# Patient Record
Sex: FEMALE | Race: WHITE
[De-identification: names, ages, dates, MRNs, and addresses within clinical notes are randomized per-mention and may not be internally consistent; named-entity substitution may affect disease eponyms.]

---

## 2018-02-07 ENCOUNTER — TRANSCRIPTION ENCOUNTER (OUTPATIENT)
Age: 28
End: 2018-02-07

## 2018-11-01 ENCOUNTER — TRANSCRIPTION ENCOUNTER (OUTPATIENT)
Age: 28
End: 2018-11-01

## 2019-01-27 ENCOUNTER — TRANSCRIPTION ENCOUNTER (OUTPATIENT)
Age: 29
End: 2019-01-27

## 2019-02-14 ENCOUNTER — TRANSCRIPTION ENCOUNTER (OUTPATIENT)
Age: 29
End: 2019-02-14

## 2019-05-01 ENCOUNTER — TRANSCRIPTION ENCOUNTER (OUTPATIENT)
Age: 29
End: 2019-05-01

## 2019-08-16 ENCOUNTER — TRANSCRIPTION ENCOUNTER (OUTPATIENT)
Age: 29
End: 2019-08-16

## 2020-02-13 PROBLEM — Z00.00 ENCOUNTER FOR PREVENTIVE HEALTH EXAMINATION: Status: ACTIVE | Noted: 2020-02-13

## 2020-02-21 ENCOUNTER — APPOINTMENT (OUTPATIENT)
Dept: CARDIOLOGY | Facility: CLINIC | Age: 30
End: 2020-02-21
Payer: COMMERCIAL

## 2020-02-21 ENCOUNTER — NON-APPOINTMENT (OUTPATIENT)
Age: 30
End: 2020-02-21

## 2020-02-21 VITALS
WEIGHT: 136 LBS | DIASTOLIC BLOOD PRESSURE: 68 MMHG | HEIGHT: 63 IN | HEART RATE: 71 BPM | OXYGEN SATURATION: 96 % | BODY MASS INDEX: 24.1 KG/M2 | SYSTOLIC BLOOD PRESSURE: 92 MMHG

## 2020-02-21 VITALS — DIASTOLIC BLOOD PRESSURE: 68 MMHG | SYSTOLIC BLOOD PRESSURE: 94 MMHG

## 2020-02-21 DIAGNOSIS — Z82.49 FAMILY HISTORY OF ISCHEMIC HEART DISEASE AND OTHER DISEASES OF THE CIRCULATORY SYSTEM: ICD-10-CM

## 2020-02-21 DIAGNOSIS — Z83.3 FAMILY HISTORY OF DIABETES MELLITUS: ICD-10-CM

## 2020-02-21 DIAGNOSIS — Z78.9 OTHER SPECIFIED HEALTH STATUS: ICD-10-CM

## 2020-02-21 DIAGNOSIS — Z13.6 ENCOUNTER FOR SCREENING FOR CARDIOVASCULAR DISORDERS: ICD-10-CM

## 2020-02-21 DIAGNOSIS — Z82.79 FAMILY HISTORY OF OTHER CONGENITAL MALFORMATIONS, DEFORMATIONS AND CHROMOSOMAL ABNORMALITIES: ICD-10-CM

## 2020-02-21 DIAGNOSIS — Q89.8 OTHER SPECIFIED CONGENITAL MALFORMATIONS: ICD-10-CM

## 2020-02-21 DIAGNOSIS — Q23.1 CONGENITAL INSUFFICIENCY OF AORTIC VALVE: ICD-10-CM

## 2020-02-21 DIAGNOSIS — Z83.438 FAMILY HISTORY OF OTHER DISORDER OF LIPOPROTEIN METABOLISM AND OTHER LIPIDEMIA: ICD-10-CM

## 2020-02-21 PROCEDURE — 0296T: CPT

## 2020-02-21 PROCEDURE — 99244 OFF/OP CNSLTJ NEW/EST MOD 40: CPT

## 2020-02-21 PROCEDURE — 93000 ELECTROCARDIOGRAM COMPLETE: CPT

## 2020-02-21 NOTE — PHYSICAL EXAM
[Normal Appearance] : normal appearance [General Appearance - Well Developed] : well developed [Well Groomed] : well groomed [General Appearance - Well Nourished] : well nourished [No Deformities] : no deformities [General Appearance - In No Acute Distress] : no acute distress [Normal Conjunctiva] : the conjunctiva exhibited no abnormalities [Eyelids - No Xanthelasma] : the eyelids demonstrated no xanthelasmas [Normal Oral Mucosa] : normal oral mucosa [No Oral Pallor] : no oral pallor [No Oral Cyanosis] : no oral cyanosis [Normal Jugular Venous A Waves Present] : normal jugular venous A waves present [Normal Jugular Venous V Waves Present] : normal jugular venous V waves present [No Jugular Venous Moreno A Waves] : no jugular venous moreno A waves [No Precordial Heave] : no precordial heave was noted [Normal] : normal [Normal Rate] : normal [Rhythm Regular] : regular [Normal S1] : normal S1 [Normal S2] : normal S2 [No Murmur] : no murmurs heard [2+] : left 2+ [No Abnormalities] : the abdominal aorta was not enlarged and no bruit was heard [No Pitting Edema] : no pitting edema present [Respiration, Rhythm And Depth] : normal respiratory rhythm and effort [Exaggerated Use Of Accessory Muscles For Inspiration] : no accessory muscle use [Auscultation Breath Sounds / Voice Sounds] : lungs were clear to auscultation bilaterally [Abdomen Tenderness] : non-tender [Abdomen Soft] : soft [Abdomen Mass (___ Cm)] : no abdominal mass palpated [Abnormal Walk] : normal gait [Gait - Sufficient For Exercise Testing] : the gait was sufficient for exercise testing [Nail Clubbing] : no clubbing of the fingernails [Petechial Hemorrhages (___cm)] : no petechial hemorrhages [Cyanosis, Localized] : no localized cyanosis [Skin Color & Pigmentation] : normal skin color and pigmentation [No Venous Stasis] : no venous stasis [] : no rash [Skin Lesions] : no skin lesions [No Skin Ulcers] : no skin ulcer [No Xanthoma] : no  xanthoma was observed [Oriented To Time, Place, And Person] : oriented to person, place, and time [Affect] : the affect was normal [Mood] : the mood was normal [No Anxiety] : not feeling anxious [S4] : no S4 [Right Carotid Bruit] : no bruit heard over the right carotid [S3] : no S3 [Left Femoral Bruit] : no bruit heard over the left femoral artery [Right Femoral Bruit] : no bruit heard over the right femoral artery [Left Carotid Bruit] : no bruit heard over the left carotid

## 2020-02-21 NOTE — DISCUSSION/SUMMARY
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 Active lifestyle and regular. Exercise along with weight management is advised.\par All the above were at length reviewed. Answered all the questions. Thank you very much for this kind referral. Please do not hesitate to give me a call for any question.\par Part of this transcription was done with voice recognition software and phonetically similar errors are common. I apologize for that. Please do not hesitate to call for any questions due to above.\par

## 2020-02-21 NOTE — REVIEW OF SYSTEMS
[see HPI] : see HPI [Palpitations] : palpitations [Negative] : Heme/Lymph [Shortness Of Breath] : no shortness of breath [Chest Pain] : no chest pain [Chest  Pressure] : no chest pressure [Dyspnea on exertion] : not dyspnea during exertion [Lower Ext Edema] : no extremity edema [Leg Claudication] : no intermittent leg claudication

## 2020-02-21 NOTE — REASON FOR VISIT
[Consultation] : a consultation regarding [Palpitations] : palpitations [Parent] : parent [FreeTextEntry1] : 29-year-old female comes in for consultation referred for intermittent palpitation mainly with certain type of activity where there is a jumping and sudden deceleration involved.  She felt the symptoms since early childhood.  She was evaluated by pediatric cardiologist when she was 5 or 6 years old.  No significant findings were noted at the time.  Now she is finding it slightly irregular and lasted for longer period of time a week or 2 ago.  There is no associated dizziness lightheadedness chest pain shortness of breath.  There is no history of near syncopal or syncopal event\par There is no family history of sudden cardiac death\par There is family history of cardiovascular illness which includes family history of bicuspid aortic valve in her siblings and family history of cardiac arrhythmias requiring devices, family history of congestive heart failure\par She has no history of hypertension diabetes mellitus myocardial infarction coronary artery disease cerebrovascular accident peripheral arterial disease\par She has no history of Lyme disease or thyroid disorder\par Her weight diet appetite is stable\par She remains very active place singles badminton, hikes and jogs on a regular basis.\par She has no increased caffeine or alcohol intake.  She has about 1-2 drinks of caffeinated drinks.\par She has no other medication besides the one listed.\par No over-the-counter medications.

## 2020-03-10 ENCOUNTER — TRANSCRIPTION ENCOUNTER (OUTPATIENT)
Age: 30
End: 2020-03-10

## 2020-03-13 PROCEDURE — 0298T: CPT

## 2020-03-19 ENCOUNTER — APPOINTMENT (OUTPATIENT)
Dept: CARDIOLOGY | Facility: CLINIC | Age: 30
End: 2020-03-19

## 2020-03-23 ENCOUNTER — APPOINTMENT (OUTPATIENT)
Dept: CARDIOLOGY | Facility: CLINIC | Age: 30
End: 2020-03-23

## 2020-05-04 ENCOUNTER — APPOINTMENT (OUTPATIENT)
Dept: CARDIOLOGY | Facility: CLINIC | Age: 30
End: 2020-05-04

## 2020-05-21 ENCOUNTER — APPOINTMENT (OUTPATIENT)
Dept: CARDIOLOGY | Facility: CLINIC | Age: 30
End: 2020-05-21

## 2020-08-24 ENCOUNTER — APPOINTMENT (OUTPATIENT)
Dept: CARDIOLOGY | Facility: CLINIC | Age: 30
End: 2020-08-24
Payer: COMMERCIAL

## 2020-08-24 PROCEDURE — 93306 TTE W/DOPPLER COMPLETE: CPT

## 2020-08-24 PROCEDURE — 93015 CV STRESS TEST SUPVJ I&R: CPT

## 2020-09-28 ENCOUNTER — APPOINTMENT (OUTPATIENT)
Dept: CARDIOLOGY | Facility: CLINIC | Age: 30
End: 2020-09-28
Payer: COMMERCIAL

## 2020-09-28 VITALS
DIASTOLIC BLOOD PRESSURE: 62 MMHG | HEIGHT: 63 IN | OXYGEN SATURATION: 98 % | WEIGHT: 141 LBS | HEART RATE: 74 BPM | SYSTOLIC BLOOD PRESSURE: 88 MMHG | BODY MASS INDEX: 24.98 KG/M2

## 2020-09-28 PROCEDURE — 99215 OFFICE O/P EST HI 40 MIN: CPT

## 2020-09-28 NOTE — PHYSICAL EXAM
[General Appearance - Well Developed] : well developed [Normal Appearance] : normal appearance [Well Groomed] : well groomed [General Appearance - Well Nourished] : well nourished [No Deformities] : no deformities [General Appearance - In No Acute Distress] : no acute distress [Normal Conjunctiva] : the conjunctiva exhibited no abnormalities [Eyelids - No Xanthelasma] : the eyelids demonstrated no xanthelasmas [Normal Jugular Venous A Waves Present] : normal jugular venous A waves present [Normal Jugular Venous V Waves Present] : normal jugular venous V waves present [No Jugular Venous Moreno A Waves] : no jugular venous moreno A waves [Respiration, Rhythm And Depth] : normal respiratory rhythm and effort [Exaggerated Use Of Accessory Muscles For Inspiration] : no accessory muscle use [Auscultation Breath Sounds / Voice Sounds] : lungs were clear to auscultation bilaterally [Abnormal Walk] : normal gait [Gait - Sufficient For Exercise Testing] : the gait was sufficient for exercise testing [Nail Clubbing] : no clubbing of the fingernails [Cyanosis, Localized] : no localized cyanosis [Petechial Hemorrhages (___cm)] : no petechial hemorrhages [Skin Color & Pigmentation] : normal skin color and pigmentation [] : no rash [No Venous Stasis] : no venous stasis [Skin Lesions] : no skin lesions [No Skin Ulcers] : no skin ulcer [No Xanthoma] : no  xanthoma was observed [Oriented To Time, Place, And Person] : oriented to person, place, and time [Affect] : the affect was normal [Mood] : the mood was normal [No Anxiety] : not feeling anxious [Normal] : normal [No Precordial Heave] : no precordial heave was noted [Normal Rate] : normal [Rhythm Regular] : regular [Normal S1] : normal S1 [Normal S2] : normal S2 [S3] : no S3 [S4] : no S4 [No Murmur] : no murmurs heard [Right Carotid Bruit] : no bruit heard over the right carotid [Left Carotid Bruit] : no bruit heard over the left carotid [Right Femoral Bruit] : no bruit heard over the right femoral artery [Left Femoral Bruit] : no bruit heard over the left femoral artery [2+] : left 2+ [No Abnormalities] : the abdominal aorta was not enlarged and no bruit was heard [No Pitting Edema] : no pitting edema present

## 2020-09-28 NOTE — REVIEW OF SYSTEMS
[Shortness Of Breath] : no shortness of breath [Dyspnea on exertion] : not dyspnea during exertion [Chest  Pressure] : no chest pressure [Chest Pain] : no chest pain [Lower Ext Edema] : no extremity edema [Leg Claudication] : no intermittent leg claudication [Palpitations] : palpitations [see HPI] : see HPI [Dizziness] : dizziness [Tremor] : no tremor was seen [Numbness (Hypesthesia)] : no numbness [Convulsions] : no convulsions [Tingling (Paresthesia)] : no tingling [Negative] : Heme/Lymph

## 2020-09-28 NOTE — ASSESSMENT
[FreeTextEntry1] : ekg: nsr normal qt intervals\par \par Reviewed September 28, 2020\par Echocardiogram and exercise treadmill stress test as noted above event monitoring.\par \par Event monitoring.  2 minutes 29 seconds of PSVT.  Maximum heart rate 240.\par \par Labs from May 1, 2020 TSH 0.9 LDL 89 HDL 52 total cholesterol 152 sodium 137 potassium 4.2 creatinine 0.59 LFT normal CBC stable

## 2020-09-28 NOTE — DISCUSSION/SUMMARY
[FreeTextEntry1] : 29-year-old female with above medical history active medical problems as noted below\par 1.  PSVT.  Reviewed with her at length.  Pathophysiology discussed.  Metoprolol after explaining risk benefits alternatives started.  If there is side effects she will contact us.  Electrophysiology consultation recommended.  Valsalva maneuver reviewed.  Persistent symptoms to contact us.  Referred her for a study management of PSVT.\par Any significant persistent symptoms she will call 911.  High risk symptoms reviewed.  Lower caffeine alcohol intake regular activity exercise program discussed.\par 2.  Anxiety and stress management\par Recommended to follow-up with primary care physician on a regular basis\par \par Counseling regarding low saturated fat, salt and carbohydrate intake was reviewed. Active lifestyle and regular. Exercise along with weight management is advised.\par All the above were at length reviewed. Answered all the questions. Thank you very much for this kind referral. Please do not hesitate to give me a call for any question.\par Part of this transcription was done with voice recognition software and phonetically similar errors are common. I apologize for that. Please do not hesitate to call for any questions due to above.\par Follow-up otherwise in 3 months.  EP consultation scheduled.

## 2020-09-28 NOTE — REASON FOR VISIT
[Follow-Up - Clinic] : a clinic follow-up of [Palpitations] : palpitations [FreeTextEntry1] : 29-year-old female comes in for consultation referred for intermittent palpitation mainly with certain type of activity where there is a jumping and sudden deceleration involved.  She felt the symptoms since early childhood.  She was evaluated by pediatric cardiologist when she was 5 or 6 years old.  No significant findings were noted at the time.  Now she is finding it slightly irregular and lasted for longer period of time a week or 2 ago.  There is no associated dizziness lightheadedness chest pain shortness of breath.  There is no history of near syncopal or syncopal event\par There is no family history of sudden cardiac death\par There is family history of cardiovascular illness which includes family history of bicuspid aortic valve in her siblings and family history of cardiac arrhythmias requiring devices, family history of congestive heart failure\par She has no history of hypertension diabetes mellitus myocardial infarction coronary artery disease cerebrovascular accident peripheral arterial disease\par She has no history of Lyme disease or thyroid disorder\par Her weight diet appetite is stable\par She remains very active place singles badminton, hikes and jogs on a regular basis.\par She has no increased caffeine or alcohol intake.  She has about 1-2 drinks of caffeinated drinks.\par She has no other medication besides the one listed.\par No over-the-counter medications.\par \par Since her bowels he had 1 or 2 more episodes.  Lasting for few minutes.  1 of them associated with mild dizziness.  Significant stress.\par No near syncopal or syncopal episode no chest pain. [Parent] : parent

## 2020-10-28 ENCOUNTER — APPOINTMENT (OUTPATIENT)
Dept: ELECTROPHYSIOLOGY | Facility: CLINIC | Age: 30
End: 2020-10-28
Payer: COMMERCIAL

## 2020-10-28 ENCOUNTER — NON-APPOINTMENT (OUTPATIENT)
Age: 30
End: 2020-10-28

## 2020-10-28 VITALS
SYSTOLIC BLOOD PRESSURE: 86 MMHG | HEART RATE: 69 BPM | OXYGEN SATURATION: 99 % | HEIGHT: 63 IN | BODY MASS INDEX: 24.1 KG/M2 | WEIGHT: 136 LBS | TEMPERATURE: 98 F | DIASTOLIC BLOOD PRESSURE: 58 MMHG

## 2020-10-28 DIAGNOSIS — R00.2 PALPITATIONS: ICD-10-CM

## 2020-10-28 DIAGNOSIS — F32.9 ANXIETY DISORDER, UNSPECIFIED: ICD-10-CM

## 2020-10-28 DIAGNOSIS — I47.1 SUPRAVENTRICULAR TACHYCARDIA: ICD-10-CM

## 2020-10-28 DIAGNOSIS — F41.9 ANXIETY DISORDER, UNSPECIFIED: ICD-10-CM

## 2020-10-28 PROCEDURE — 99072 ADDL SUPL MATRL&STAF TM PHE: CPT

## 2020-10-28 PROCEDURE — 99244 OFF/OP CNSLTJ NEW/EST MOD 40: CPT

## 2020-10-28 PROCEDURE — 93000 ELECTROCARDIOGRAM COMPLETE: CPT

## 2020-11-01 PROBLEM — R00.2 PALPITATIONS: Status: ACTIVE | Noted: 2020-02-21

## 2020-11-01 PROBLEM — F41.9 ANXIETY AND DEPRESSION: Status: ACTIVE | Noted: 2020-02-21

## 2020-11-01 PROBLEM — I47.1 PSVT (PAROXYSMAL SUPRAVENTRICULAR TACHYCARDIA): Status: ACTIVE | Noted: 2020-09-28

## 2020-11-01 NOTE — HISTORY OF PRESENT ILLNESS
[FreeTextEntry1] : Patient is a 29-year woman who is seen in consultation regarding her palpitations.  She has had these palpitations since age 5.  There appears where it has subsided but recently she has been experiencing episodes.  The episodes typically occur with some degree of exertion/exercise but also she has had episodes while at rest.  They last few seconds to a minute.  She experienced episodes while playing badminton.  Patient is also a runner and has had occasional episodes with running.  She gets these episodes a few times per month sometimes less.\par \par The patient has a prior history of anxiety/depression.  There is also questionable prior history of PFO question closure.  She was seen physician at [facility] they do not have her records at this time.\par \par The patient was started on metoprolol and unclear what it is improving her symptoms.\par \par An  echocardiogram from 8/24/2020 showed EF 65%, normal left atrial size, minimal mitral vegetation, normal diastolic function, no pericardial effusion, aneurysmal intra-atrial septum with normal pulmonary pressures.\par \par Previous electrocardiogram showed sinus rhythm with right axis.  QT interval was normal and there was no evidence of preexcitation.  The NM interval was also normal.\par \par A Zio patch monitor performed for 13 days between 2/21/2020 and 3/6/2020 showed sinus rhythm with heart rates between 54 and 240 bpm.  Episodes of 240 were very transient.  The P waves were in the T wave and is the tachycardia slow down the P wave emerged.  Occasionally appear to the tachycardia ended abruptly.\par \par

## 2020-11-01 NOTE — PHYSICAL EXAM
[General Appearance - Well Developed] : well developed [General Appearance - In No Acute Distress] : no acute distress [Normal Conjunctiva] : the conjunctiva exhibited no abnormalities [Normal Jugular Venous V Waves Present] : normal jugular venous V waves present [Heart Rate And Rhythm] : heart rate and rhythm were normal [Heart Sounds] : normal S1 and S2 diarrhea [Murmurs] : no murmurs present [Arterial Pulses Normal] : the arterial pulses were normal [Edema] : no peripheral edema present [Auscultation Breath Sounds / Voice Sounds] : lungs were clear to auscultation bilaterally [Abdomen Soft] : soft [Abdomen Tenderness] : non-tender [Abnormal Walk] : normal gait [Nail Clubbing] : no clubbing of the fingernails [Cyanosis, Localized] : no localized cyanosis [No Venous Stasis] : no venous stasis [Impaired Insight] : insight and judgment were intact

## 2020-11-01 NOTE — REVIEW OF SYSTEMS
[Fever] : no fever [Headache] : no headache [Feeling Fatigued] : not feeling fatigued [Blurry Vision] : no blurred vision [Sore Throat] : no sore throat [Palpitations] : palpitations [Cough] : no cough [Abdominal Pain] : no abdominal pain [Depression] : depression [Anxiety] : anxiety [Under Stress] : under stress [Easy Bleeding] : no tendency for easy bleeding [Easy Bruising] : no tendency for easy bruising

## 2020-11-01 NOTE — DISCUSSION/SUMMARY
[FreeTextEntry1] : She has had symptomatic palpitations which some episodes appear to be sinus tachycardia and others might be atrial tach.  It appears that occasionally the tachycardia terminated abruptly.  There are times when the tachycardia actually slowed down but then terminated.  She has had stress and anxiety and is seeking further treatment of the stress and anxiety.\par \par Because of the possibility of both sinus tachycardia as well as possibly atrial tach I recommend that she increase her fluid intake and consider a low-dose beta-blocker.  I recommended a more long-term monitor to further assess her arrhythmia.  Based on the finding of this monitor and treatment of her stress and anxiety will decide whether to proceed with electrophysiologic testing.  I issued her a prescription for propanolol 10 mg p.o. twice daily.  During the monitor.  She may discontinue the medication to allow for recording of her arrhythmia.\par \par We discussed her other work-up that is needed including thyroid function testing and further evaluation by her psychologist/psychiatrist.  She will see me in follow-up after the follow-up extended monitoring.\par \par The patient verbalized understanding but is concerned about taking the beta-blocker.  She may consider taking it as needed for now.\par \par

## 2021-04-08 ENCOUNTER — TRANSCRIPTION ENCOUNTER (OUTPATIENT)
Age: 31
End: 2021-04-08

## 2021-04-14 ENCOUNTER — APPOINTMENT (OUTPATIENT)
Dept: CARDIOLOGY | Facility: CLINIC | Age: 31
End: 2021-04-14

## 2022-02-19 ENCOUNTER — TRANSCRIPTION ENCOUNTER (OUTPATIENT)
Age: 32
End: 2022-02-19

## 2022-08-18 ENCOUNTER — NON-APPOINTMENT (OUTPATIENT)
Age: 32
End: 2022-08-18

## 2023-05-02 ENCOUNTER — NON-APPOINTMENT (OUTPATIENT)
Age: 33
End: 2023-05-02

## 2023-05-29 ENCOUNTER — NON-APPOINTMENT (OUTPATIENT)
Age: 33
End: 2023-05-29

## 2023-06-22 ENCOUNTER — APPOINTMENT (OUTPATIENT)
Dept: OTOLARYNGOLOGY | Facility: CLINIC | Age: 33
End: 2023-06-22
Payer: COMMERCIAL

## 2023-06-22 VITALS — HEIGHT: 64 IN | WEIGHT: 140 LBS | BODY MASS INDEX: 23.9 KG/M2

## 2023-06-22 DIAGNOSIS — Z87.09 PERSONAL HISTORY OF OTHER DISEASES OF THE RESPIRATORY SYSTEM: ICD-10-CM

## 2023-06-22 PROCEDURE — 99203 OFFICE O/P NEW LOW 30 MIN: CPT

## 2023-06-22 RX ORDER — METOPROLOL SUCCINATE 25 MG/1
25 TABLET, EXTENDED RELEASE ORAL DAILY
Qty: 90 | Refills: 0 | Status: DISCONTINUED | COMMUNITY
Start: 2020-09-28 | End: 2023-06-22

## 2023-06-22 RX ORDER — ESCITALOPRAM OXALATE 5 MG/1
5 TABLET, FILM COATED ORAL
Refills: 0 | Status: ACTIVE | COMMUNITY

## 2023-06-22 RX ORDER — VILAZODONE HYDROCHLORIDE 40 MG/1
40 TABLET ORAL DAILY
Refills: 0 | Status: DISCONTINUED | COMMUNITY
Start: 2020-02-09 | End: 2023-06-22

## 2023-06-22 RX ORDER — ALPRAZOLAM 0.25 MG/1
0.25 TABLET ORAL
Qty: 30 | Refills: 0 | Status: DISCONTINUED | COMMUNITY
Start: 2020-02-21 | End: 2023-06-22

## 2023-06-24 ENCOUNTER — TRANSCRIPTION ENCOUNTER (OUTPATIENT)
Age: 33
End: 2023-06-24

## 2023-06-26 PROBLEM — Z87.09 HISTORY OF THROAT PROBLEM: Status: ACTIVE | Noted: 2023-06-26

## 2023-06-26 NOTE — ASSESSMENT
[FreeTextEntry1] : 1–tonsil remnant tissue.  Reassured her that no further work-up or treatment is required.\par \par 2–perceived changes in sense of smell.\par [facility] smell test was normal.  Not going to recommend further work-up at this time.

## 2023-06-26 NOTE — HISTORY OF PRESENT ILLNESS
[de-identified] : Initial visit.\par Her chief complaint is "raised round bump in throat; (loss of sense of smell for a while–unrelated?)".\par \par She reports that approximately a month and a half ago she had a sore throat.  Evaluated by urgent care.  Culture was negative.  STD testing was negative.\par Her mother is a nurse appreciated a potential lesion in her throat.\par She is not having any pain at this point.\par No bleeding.\par Not a smoker.\par No dysphagia,\par Weight loss hemoptysis.\par \par  she also reports that ongoing for several years her sense of smell is not as acute as she would expect it to be.\par No head trauma.\par No history of sinus problems.